# Patient Record
Sex: MALE | Race: WHITE | NOT HISPANIC OR LATINO | Employment: FULL TIME | URBAN - METROPOLITAN AREA
[De-identification: names, ages, dates, MRNs, and addresses within clinical notes are randomized per-mention and may not be internally consistent; named-entity substitution may affect disease eponyms.]

---

## 2017-02-06 ENCOUNTER — HOSPITAL ENCOUNTER (EMERGENCY)
Facility: HOSPITAL | Age: 32
Discharge: HOME | End: 2017-02-06
Attending: EMERGENCY MEDICINE
Payer: COMMERCIAL

## 2017-02-06 VITALS
SYSTOLIC BLOOD PRESSURE: 138 MMHG | HEIGHT: 71 IN | WEIGHT: 219 LBS | DIASTOLIC BLOOD PRESSURE: 72 MMHG | HEART RATE: 99 BPM | TEMPERATURE: 98 F | BODY MASS INDEX: 30.66 KG/M2 | OXYGEN SATURATION: 98 % | RESPIRATION RATE: 20 BRPM

## 2017-02-06 DIAGNOSIS — R51.9 SUDDEN ONSET OF SEVERE HEADACHE: ICD-10-CM

## 2017-02-06 DIAGNOSIS — G43.009 NONINTRACTABLE MIGRAINE, UNSPECIFIED MIGRAINE TYPE: Primary | ICD-10-CM

## 2017-02-06 LAB
ALBUMIN SERPL BCP-MCNC: 3.9 G/DL
ALP SERPL-CCNC: 68 U/L
ALT SERPL W/O P-5'-P-CCNC: 45 U/L
ANION GAP SERPL CALC-SCNC: 10 MMOL/L
AST SERPL-CCNC: 49 U/L
BASOPHILS # BLD AUTO: 0.1 K/UL
BASOPHILS NFR BLD: 0.7 %
BILIRUB SERPL-MCNC: 2 MG/DL
BUN SERPL-MCNC: 15 MG/DL
CALCIUM SERPL-MCNC: 8.9 MG/DL
CHLORIDE SERPL-SCNC: 106 MMOL/L
CLARITY CSF: CLEAR
CO2 SERPL-SCNC: 24 MMOL/L
COLOR CSF: COLORLESS
CREAT SERPL-MCNC: 1.2 MG/DL
DIFFERENTIAL METHOD: ABNORMAL
EOSINOPHIL # BLD AUTO: 0.1 K/UL
EOSINOPHIL NFR BLD: 0.8 %
ERYTHROCYTE [DISTWIDTH] IN BLOOD BY AUTOMATED COUNT: 14.8 %
EST. GFR  (AFRICAN AMERICAN): >60 ML/MIN/1.73 M^2
EST. GFR  (NON AFRICAN AMERICAN): >60 ML/MIN/1.73 M^2
GLUCOSE CSF-MCNC: 52 MG/DL
GLUCOSE SERPL-MCNC: 94 MG/DL
HCT VFR BLD AUTO: 46.5 %
HGB BLD-MCNC: 15.5 G/DL
INR PPP: 1.1
LYMPHOCYTES # BLD AUTO: 0.6 K/UL
LYMPHOCYTES NFR BLD: 6.9 %
MCH RBC QN AUTO: 28.5 PG
MCHC RBC AUTO-ENTMCNC: 33.4 %
MCV RBC AUTO: 86 FL
MONOCYTES # BLD AUTO: 0.4 K/UL
MONOCYTES NFR BLD: 4.8 %
NEUTROPHILS # BLD AUTO: 7.4 K/UL
NEUTROPHILS NFR BLD: 86.8 %
PLATELET # BLD AUTO: 147 K/UL
PMV BLD AUTO: 8.8 FL
POTASSIUM SERPL-SCNC: 3.8 MMOL/L
PROT CSF-MCNC: 52 MG/DL
PROT SERPL-MCNC: 6.5 G/DL
PROTHROMBIN TIME: 11.1 SEC
RBC # BLD AUTO: 5.44 M/UL
RBC # CSF: 2 /CU MM
SODIUM SERPL-SCNC: 140 MMOL/L
SPECIMEN VOL CSF: 7 ML
WBC # BLD AUTO: 8.5 K/UL
WBC # CSF: 2 /CU MM

## 2017-02-06 PROCEDURE — 96365 THER/PROPH/DIAG IV INF INIT: CPT

## 2017-02-06 PROCEDURE — 89051 BODY FLUID CELL COUNT: CPT

## 2017-02-06 PROCEDURE — 25000003 PHARM REV CODE 250

## 2017-02-06 PROCEDURE — 63600175 PHARM REV CODE 636 W HCPCS: Performed by: EMERGENCY MEDICINE

## 2017-02-06 PROCEDURE — 87205 SMEAR GRAM STAIN: CPT

## 2017-02-06 PROCEDURE — 99284 EMERGENCY DEPT VISIT MOD MDM: CPT | Mod: 25

## 2017-02-06 PROCEDURE — 84157 ASSAY OF PROTEIN OTHER: CPT

## 2017-02-06 PROCEDURE — 96375 TX/PRO/DX INJ NEW DRUG ADDON: CPT

## 2017-02-06 PROCEDURE — 25000003 PHARM REV CODE 250: Performed by: EMERGENCY MEDICINE

## 2017-02-06 PROCEDURE — 36415 COLL VENOUS BLD VENIPUNCTURE: CPT

## 2017-02-06 PROCEDURE — 85610 PROTHROMBIN TIME: CPT

## 2017-02-06 PROCEDURE — 99000 SPECIMEN HANDLING OFFICE-LAB: CPT

## 2017-02-06 PROCEDURE — 85025 COMPLETE CBC W/AUTO DIFF WBC: CPT

## 2017-02-06 PROCEDURE — 62270 DX LMBR SPI PNXR: CPT

## 2017-02-06 PROCEDURE — 87070 CULTURE OTHR SPECIMN AEROBIC: CPT

## 2017-02-06 PROCEDURE — 80053 COMPREHEN METABOLIC PANEL: CPT

## 2017-02-06 PROCEDURE — 82945 GLUCOSE OTHER FLUID: CPT

## 2017-02-06 RX ORDER — BUTALBITAL, ACETAMINOPHEN AND CAFFEINE 50; 325; 40 MG/1; MG/1; MG/1
1 TABLET ORAL EVERY 6 HOURS PRN
Qty: 12 TABLET | Refills: 0 | Status: SHIPPED | OUTPATIENT
Start: 2017-02-06 | End: 2017-02-11

## 2017-02-06 RX ORDER — LIDOCAINE HYDROCHLORIDE 10 MG/ML
INJECTION, SOLUTION EPIDURAL; INFILTRATION; INTRACAUDAL; PERINEURAL
Status: COMPLETED
Start: 2017-02-06 | End: 2017-02-06

## 2017-02-06 RX ORDER — PROMETHAZINE HYDROCHLORIDE 25 MG/1
25 TABLET ORAL EVERY 8 HOURS PRN
Qty: 15 TABLET | Refills: 0 | Status: SHIPPED | OUTPATIENT
Start: 2017-02-06 | End: 2017-02-11

## 2017-02-06 RX ORDER — ACETAMINOPHEN 500 MG
1000 TABLET ORAL
Status: COMPLETED | OUTPATIENT
Start: 2017-02-06 | End: 2017-02-06

## 2017-02-06 RX ORDER — DIAZEPAM 5 MG/1
5 TABLET ORAL
Status: COMPLETED | OUTPATIENT
Start: 2017-02-06 | End: 2017-02-06

## 2017-02-06 RX ORDER — KETOROLAC TROMETHAMINE 30 MG/ML
30 INJECTION, SOLUTION INTRAMUSCULAR; INTRAVENOUS
Status: COMPLETED | OUTPATIENT
Start: 2017-02-06 | End: 2017-02-06

## 2017-02-06 RX ADMIN — ACETAMINOPHEN 1000 MG: 500 TABLET ORAL at 05:02

## 2017-02-06 RX ADMIN — DIAZEPAM 5 MG: 5 TABLET ORAL at 04:02

## 2017-02-06 RX ADMIN — PROMETHAZINE HYDROCHLORIDE 12.5 MG: 25 INJECTION, SOLUTION INTRAMUSCULAR; INTRAVENOUS at 04:02

## 2017-02-06 RX ADMIN — LIDOCAINE HYDROCHLORIDE: 10 INJECTION, SOLUTION EPIDURAL; INFILTRATION; INTRACAUDAL; PERINEURAL at 05:02

## 2017-02-06 RX ADMIN — KETOROLAC TROMETHAMINE 30 MG: 30 INJECTION, SOLUTION INTRAMUSCULAR; INTRAVENOUS at 06:02

## 2017-02-06 NOTE — ED PROVIDER NOTES
Encounter Date: 2/6/2017    SCRIBE #1 NOTE: I, Dorothy Abdi, am scribing for, and in the presence of, Dr Bernabe.       History     Chief Complaint   Patient presents with    Headache     left side of head with nausea / facial numbness     Review of patient's allergies indicates:   Allergen Reactions    Pcn [penicillins]      HPI Comments: 02/06/2017  3:32 PM     Chief Complaint: LOPEZ      Meliton Tamayo is a 31 y.o. male with no past medical history on file presenting to the E.D. with an acute onset of a HA which began last night. Pt reports he was resting when HA began but had lifted a tanning bed shortly prior. He normally experiences headaches once every several months and current headache is more severe than baseline HA.  Associated symptoms include facial numbness. Last night he experienced nausea and emesis with nausea persisting today. He has used BC powder with mild relief of symptoms. Denies confusion, fever, neck stiffness. Pt has a past surgical history that includes Appendectomy.      The history is provided by the patient.     History reviewed. No pertinent past medical history.  No past medical history pertinent negatives.  Past Surgical History   Procedure Laterality Date    Appendectomy       History reviewed. No pertinent family history.  Social History   Substance Use Topics    Smoking status: Never Smoker    Smokeless tobacco: None    Alcohol use None     Review of Systems   Constitutional: Negative for fever.   HENT: Negative for sore throat.    Eyes: Negative for visual disturbance.   Respiratory: Negative for cough.    Cardiovascular: Negative for chest pain.   Gastrointestinal: Positive for nausea and vomiting. Negative for abdominal pain and diarrhea.   Genitourinary: Negative for difficulty urinating.   Musculoskeletal: Negative for arthralgias.   Skin: Negative for rash.   Neurological: Positive for numbness and headaches. Negative for weakness.       Physical Exam   Initial Vitals    BP Pulse Resp Temp SpO2   02/06/17 1332 02/06/17 1332 02/06/17 1332 02/06/17 1332 02/06/17 1332   145/64 118 20 98.4 °F (36.9 °C) 95 %     Physical Exam    Nursing note and vitals reviewed.  Constitutional: He appears well-developed and well-nourished.   HENT:   Head: Normocephalic and atraumatic.   Mouth/Throat: Oropharynx is clear and moist.   Eyes: Conjunctivae and EOM are normal. Pupils are equal, round, and reactive to light.   Neck: Neck supple.   Cardiovascular: Normal rate, regular rhythm, normal heart sounds and intact distal pulses. Exam reveals no gallop and no friction rub.    No murmur heard.  Pulmonary/Chest: Breath sounds normal. He has no wheezes. He has no rhonchi. He has no rales.   Abdominal: Soft. He exhibits no distension. There is no tenderness.   Musculoskeletal: Normal range of motion.   Neurological: He is alert and oriented to person, place, and time. He has normal strength. No cranial nerve deficit or sensory deficit.   Pt can feel palpation of his left face   Skin: No rash noted. No erythema.   Psychiatric: He has a normal mood and affect.         ED Course   Lumbar Puncture  Date/Time: 2/9/2017 11:15 AM  Location procedure was performed: [facility] EMERGENCY DEPARTMENT  Performed by: CUONG ESQUIVEL.  Authorized by: CUONG ESQUIVEL   Assisting provider: NAVIN ARAUJO  Pre-operative diagnosis:  Acute headache   Post-operative diagnosis: Complex migraine   Consent Done: Yes  Indications: evaluation for subarachnoid hemorrhage  Anesthesia: local infiltration    Anesthesia:  Anesthesia: local infiltration  Local Anesthetic: lidocaine 1% without epinephrine   Patient sedated: no  Description of findings: clear draining CSF   Preparation: Patient was prepped and draped in the usual sterile fashion.  Lumbar space: L3-L4 interspace  Patient's position: sitting  Needle gauge: 22  Needle type: spinal needle - Quincke tip  Needle length: 3.5 in  Number of attempts: 1  Fluid appearance:  "clear  Tubes of fluid: 4  Total volume: 10 ml  Post-procedure: adhesive bandage applied  Technical procedures used: lumbar puncture   Significant surgical tasks conducted by the assistant(s): holding patient in stable position   Complications: No  Estimated blood loss (mL): 2  Specimens: Yes  Implants: No  Patient tolerance: Patient tolerated the procedure well with no immediate complications        Labs Reviewed   CBC W/ AUTO DIFFERENTIAL - Abnormal; Notable for the following:        Result Value    RDW 14.8 (*)     Platelets 147 (*)     MPV 8.8 (*)     Lymph # 0.6 (*)     Gran% 86.8 (*)     Lymph% 6.9 (*)     All other components within normal limits   COMPREHENSIVE METABOLIC PANEL - Abnormal; Notable for the following:     Total Bilirubin 2.0 (*)     AST 49 (*)     ALT 45 (*)     All other components within normal limits   PROTEIN, CSF - Abnormal; Notable for the following:     Protein, CSF 52 (*)     All other components within normal limits   CSF CELL COUNT WITH DIFFERENTIAL - Abnormal; Notable for the following:     RBC, CSF 2 (*)     All other components within normal limits   CULTURE, CSF  (INCLUDES STAIN)   PROTIME-INR   GLUCOSE, CSF   FREEZE AND HOLD -              Medical Decision Making:   Initial Assessment:   Patient interviewed and examined and found to be in NAD and nonfocal. Patient reports of facial numbness are appears more as paresthesia of the left face as opposed to true "numbness". His reports of acutely worsening, worst headache of his life after exertion are concerning potential underlying intracranial vascular catastrophe. IV was established and he was provided multiple medications with symptom improvement.   Differential Diagnosis:   DDx include, but are not limited to, Complex/typical migraine, tension HA, cluster HA, SAH, acute ischemic stroke,   ED Management:  CT scan negative for acute process. LP shows no signifcant elevation in RBCs, protein, WBCs. On reassessment, patient was " reporting improved pain symptoms but some continuing facial tingling. Asked to monitor closely at home and either f/u with his PCP asap or return to the department. Patient and family in agreement with disposition and plan for f/u. DCd in stable condition with his sig other as a .             Scribe Attestation:   Scribe #1: I performed the above scribed service and the documentation accurately describes the services I performed. I attest to the accuracy of the note.    Attending Attestation:           Physician Attestation for Scribe:  Physician Attestation Statement for Scribe #1: I, Dr. Bernabe, reviewed documentation, as scribed by Dorothy Abdi in my presence, and it is both accurate and complete.                 ED Course     Clinical Impression:   The primary encounter diagnosis was Nonintractable migraine, unspecified migraine type. A diagnosis of Sudden onset of severe headache was also pertinent to this visit.    Disposition:   Disposition: Discharged  Condition: Stable       Jerry Bernabe MD  02/09/17 1110

## 2017-02-06 NOTE — ED NOTES
"States that he had "the worst migraine I've ever had last night" took a BC powder and got some relief also states diarrhea that started yesterday, states main concern is that today all of the left side of his face feels numb including his tongue. Alert calm even non labored respirations. Denies fever states nausea. Family member in chair next to pt aware to notify nurse of needs or concerns.   "

## 2017-02-06 NOTE — ED AVS SNAPSHOT
[facility] [address] [place] [address]               Meliton Tamayo   2017  3:06 PM   ED    Description:  Male : 1985   Department:  [facility]           Your Care was Coordinated By:     Provider Role From To    Jerry Bernabe MD Attending Provider 17 1522 --      Reason for Visit     Headache           Diagnoses this Visit        Comments    Nonintractable migraine, unspecified migraine type    -  Primary     Sudden onset of severe headache           ED Disposition     None           To Do List           Follow-up Information     Schedule an appointment as soon as possible for a visit with [facility].    Specialty:  Family Medicine    Contact information:    [address] [facility] [address] [phone]        Follow up with [facility].    Specialty:  Emergency Medicine    Why:  If symptoms worsen    Contact information:    [address] [place] [address] [phone]       These Medications        Disp Refills Start End    butalbital-acetaminophen-caffeine -40 mg (FIORICET, ESGIC) -40 mg per tablet 12 tablet 0 2017    Take 1 tablet by mouth every 6 (six) hours as needed for Pain. - Oral    promethazine (PHENERGAN) 25 MG tablet 15 tablet 0 2017    Take 1 tablet (25 mg total) by mouth every 8 (eight) hours as needed for Nausea. - Oral      [facility]     [facility] -  Assistance  Registered nurses in the [facility] provide clinical advisement, health education, appointment booking, and other advisory services.  Call for this free service at [phone].             Medications           Message regarding Medications     Verify the changes and/or additions to your medication regime listed below are the same as discussed with your clinician today.  If any of  these changes or additions are incorrect, please notify your healthcare provider.        START taking these NEW medications        Refills    butalbital-acetaminophen-caffeine -40 mg (FIORICET, ESGIC) -40 mg per tablet 0    Sig: Take 1 tablet by mouth every 6 (six) hours as needed for Pain.    Class: Print    Route: Oral    promethazine (PHENERGAN) 25 MG tablet 0    Sig: Take 1 tablet (25 mg total) by mouth every 8 (eight) hours as needed for Nausea.    Class: Print    Route: Oral      These medications were administered today        Dose Freq    promethazine (PHENERGAN) 12.5 mg in dextrose 5 % 50 mL IVPB 12.5 mg ED 1 Time    Sig: Inject 12.5 mg into the vein ED 1 Time.    Class: Normal    Route: Intravenous    diazePAM tablet 5 mg 5 mg ED 1 Time    Sig: Take 1 tablet (5 mg total) by mouth ED 1 Time.    Class: Normal    Route: Oral    lidocaine (PF) 10 mg/ml (1%) 10 mg/mL (1 %) injection      Notes to Pharmacy: Created by cabinet override    acetaminophen tablet 1,000 mg 1,000 mg ED 1 Time    Sig: Take 2 tablets (1,000 mg total) by mouth ED 1 Time.    Class: Normal    Route: Oral    ketorolac injection 30 mg 30 mg ED 1 Time    Sig: Inject 30 mg into the vein ED 1 Time.    Class: Normal    Route: Intravenous           Verify that the below list of medications is an accurate representation of the medications you are currently taking.  If none reported, the list may be blank. If incorrect, please contact your healthcare provider. Carry this list with you in case of emergency.           Current Medications     butalbital-acetaminophen-caffeine -40 mg (FIORICET, ESGIC) -40 mg per tablet Take 1 tablet by mouth every 6 (six) hours as needed for Pain.    promethazine (PHENERGAN) 25 MG tablet Take 1 tablet (25 mg total) by mouth every 8 (eight) hours as needed for Nausea.           Clinical Reference Information           Your Vitals Were     BP Pulse Temp Resp Height Weight    138/72 99 98.4 °F (36.9  "°C) (Oral) 20 5' 11" (1.803 m) 99.3 kg (219 lb)    SpO2 BMI             98% 30.54 kg/m2         Allergies as of 2/6/2017        Reactions    Pcn [Penicillins]       Immunizations Administered on Date of Encounter - 2/6/2017     None      ED Micro, Lab, POCT     Start Ordered       Status Ordering Provider    02/06/17 1834 02/06/17 1834  CSF culture  Once      Preliminary result     02/06/17 1720 02/06/17 1719  Glucose, CSF (Tube 1)  STAT      Final result     02/06/17 1720 02/06/17 1719  Protein, CSF (Tube 1)  STAT      Final result     02/06/17 1720 02/06/17 1719    Once,   Status:  Canceled      Canceled     02/06/17 1720 02/06/17 1719  CSF cell count with differential (Tube 4)  Once      Final result     02/06/17 1720 02/06/17 1719  Freeze and Hold,   Once      Collected     02/06/17 1549 02/06/17 1549  Complete Blood Count (CBC)  STAT      Final result     02/06/17 1549 02/06/17 1549  Comprehensive Metabolic Panel (CMP)  STAT      Final result     02/06/17 1549 02/06/17 1549  Protime-INR  STAT      Final result       ED Imaging Orders     Start Ordered       Status Ordering Provider    02/06/17 1549 02/06/17 1549  CT Head Without Contrast  1 time imaging      Final result         Discharge Instructions         Migraine Headache: Stages and Treatment  A migraine headache tends to progress in stages. Learning these stages can help you better understand what is happening. Then you can learn ways to reduce pain and relieve other symptoms. Methods for relieving your symptoms include self-care and medicines.  Migraine stages  Migraines tend to progress through 4 stages. Many people don't have all stages, and stages may differ with each headache:  · Prodrome. A few hours to a day or so before the headache, you may feel tired, (yawning many times), uneasy, or diaz. You may also feel bloated or crave certain foods.  · Aura. Up to an hour before the headache starts, some migraine sufferers experience aura--flashing " "lights, blind spots, other vision problems, confusion, difficulty speaking, or other neurologic symptoms.  · Headache. Moderate to severe pain affects one side of the head and then can spread to both sides, often along with nausea. You may be highly sensitive to light, sound, and odors. Vomiting or diarrhea may also happen. This stage lasts 4 to 72 hours.  · Postdrome. After your headache ends, you may feel tired, achy, and "washed out." This may last for a day or so.    Self-care during a migraine  Here is what you can do:  · Use a cold compress. Wrap a thin cloth around a cold pack, a cold can of soda, or a bag of frozen vegetables. Apply this to your temple or other pain site.  · Drink fluids. If nausea makes it hard to drink, try sucking on ice.  · Rest. If possible, lie down. Try not to bend over, as this may increase your pain. Sometimes laying in a dark quiet room can help the migraine from being aggravated.    · Try caffeine. Some people find that drinking fluids with caffeine, such as coffee or tea, helps to lessen migraine pain.  Using medicines  Work with your healthcare provider to find the right medicines for you. Medicines for migraine may relieve pain (analgesics), relieve nausea, or attack the migraine's root causes (migraine-specific medicines).  Rebound headache  Taking analgesics each day, or even several times a week, may lead to more frequent and severe headaches. These are called rebound headaches. If you think you're having rebound headaches, tell your healthcare provider. He or she can help you safely decrease your medicine. Rebound caffeine withdrawal headaches can also happen.    Date Last Reviewed: 10/9/2015  © 4510-3642 [employer]. [address] [place] [address], [place] [address]. All rights reserved. This information is not intended as a substitute for professional medical care. Always follow your healthcare professional's instructions.          [facility] Sign-Up     Activating " your [facility] account is as easy as 1-2-3!     1) Visit [email or web address], select Sign Up Now, enter this activation code and your date of birth, then select Next.  [email or web address]  Expires: 3/23/2017  7:56 PM      2) Create a username and password to use when you visit [facility] in the future and select a security question in case you lose your password and select Next.    3) Enter your e-mail address and click Sign Up!    Additional Information  If you have questions, please e-mail [email or web address] or call [phone] to talk to our [facility] staff. Remember, [facility] is NOT to be used for urgent needs. For medical emergencies, dial 911.          [facility] complies with applicable Federal civil rights laws and does not discriminate on the basis of race, color, national origin, age, disability, or sex.        Language Assistance Services     ATTENTION: Language assistance services are available, free of charge. Please call [phone].      ATENCIÓN: Si habla español, tiene a bennett disposición servicios gratuitos de asistencia lingüística. Llame al [phone].     CHÚ Ý: N?u b?n nói Ti?ng Vi?t, có các d?ch v? h? tr? ngôn ng? mi?n phí dành cho b?n. G?i s? [phone].        Medications Administered     acetaminophen tablet 1,000 mg                  diazePAM tablet 5 mg                  ketorolac injection 30 mg                  lidocaine (PF) 10 mg/ml (1%) 10 mg/mL (1 %) injection                  promethazine (PHENERGAN) 12.5 mg in dextrose 5 % 50 mL IVPB                    Administrations This Visit        Admin Date Action                   acetaminophen tablet 1,000 mg 02/06/2017 Given                   Admin Date Action                   diazePAM tablet 5 mg 02/06/2017 Given                   Admin Date Action                   ketorolac injection 30 mg 02/06/2017 Given                   Admin Date Action                   lidocaine (PF) 10 mg/ml (1%) 10 mg/mL (1 %) injection  02/06/2017 Given by Other                   Admin Date Action                   promethazine (PHENERGAN) 12.5 mg in dextrose 5 % 50 mL IVPB 02/06/2017 New Bag                  Administrations This Visit     acetaminophen tablet 1,000 mg     Admin Date Action Dose Route Administered By             02/06/2017 Given 1000 mg Oral Dana Johnson RN                    diazePAM tablet 5 mg     Admin Date Action Dose Route Administered By             02/06/2017 Given 5 mg Oral Dana Johnson RN                    ketorolac injection 30 mg     Admin Date Action Dose Route Administered By             02/06/2017 Given 30 mg Intravenous Dana Johnson RN                    lidocaine (PF) 10 mg/ml (1%) 10 mg/mL (1 %) injection     Admin Date Action Dose Route Administered By             02/06/2017 Given by Other    Dana Johnson RN                    promethazine (PHENERGAN) 12.5 mg in dextrose 5 % 50 mL IVPB     Admin Date Action Dose Rate Route Administered By          02/06/2017 New Bag 12.5 mg 150 mL/hr Intravenous Dana Johnson RN

## 2017-02-07 NOTE — DISCHARGE INSTRUCTIONS
"  Migraine Headache: Stages and Treatment  A migraine headache tends to progress in stages. Learning these stages can help you better understand what is happening. Then you can learn ways to reduce pain and relieve other symptoms. Methods for relieving your symptoms include self-care and medicines.  Migraine stages  Migraines tend to progress through 4 stages. Many people don't have all stages, and stages may differ with each headache:  · Prodrome. A few hours to a day or so before the headache, you may feel tired, (yawning many times), uneasy, or diaz. You may also feel bloated or crave certain foods.  · Aura. Up to an hour before the headache starts, some migraine sufferers experience aura--flashing lights, blind spots, other vision problems, confusion, difficulty speaking, or other neurologic symptoms.  · Headache. Moderate to severe pain affects one side of the head and then can spread to both sides, often along with nausea. You may be highly sensitive to light, sound, and odors. Vomiting or diarrhea may also happen. This stage lasts 4 to 72 hours.  · Postdrome. After your headache ends, you may feel tired, achy, and "washed out." This may last for a day or so.    Self-care during a migraine  Here is what you can do:  · Use a cold compress. Wrap a thin cloth around a cold pack, a cold can of soda, or a bag of frozen vegetables. Apply this to your temple or other pain site.  · Drink fluids. If nausea makes it hard to drink, try sucking on ice.  · Rest. If possible, lie down. Try not to bend over, as this may increase your pain. Sometimes laying in a dark quiet room can help the migraine from being aggravated.    · Try caffeine. Some people find that drinking fluids with caffeine, such as coffee or tea, helps to lessen migraine pain.  Using medicines  Work with your healthcare provider to find the right medicines for you. Medicines for migraine may relieve pain (analgesics), relieve nausea, or attack the " migraine's root causes (migraine-specific medicines).  Rebound headache  Taking analgesics each day, or even several times a week, may lead to more frequent and severe headaches. These are called rebound headaches. If you think you're having rebound headaches, tell your healthcare provider. He or she can help you safely decrease your medicine. Rebound caffeine withdrawal headaches can also happen.    Date Last Reviewed: 10/9/2015  © 9344-2030 [employer]. [address] [place] [address], [place] [address]. All rights reserved. This information is not intended as a substitute for professional medical care. Always follow your healthcare professional's instructions.

## 2017-02-07 NOTE — ED NOTES
Upon discharge, patient is AAOx4, no cardiac or respiratory complications. Follow up care and  Medications have been reviewed with patient and has been instructed to return to the ER if needed. Patient verbalized understanding and ambulated to the lobby without difficulty. LETI MOSQUEDA.

## 2017-02-11 LAB
CMV SPEC QL SHELL VIAL CULT: NO GROWTH
GRAM STN SPEC: NORMAL

## 2017-12-27 ENCOUNTER — HOSPITAL ENCOUNTER (EMERGENCY)
Facility: HOSPITAL | Age: 32
Discharge: HOME | End: 2017-12-27
Attending: EMERGENCY MEDICINE

## 2017-12-27 VITALS
TEMPERATURE: 99 F | BODY MASS INDEX: 30.1 KG/M2 | DIASTOLIC BLOOD PRESSURE: 80 MMHG | SYSTOLIC BLOOD PRESSURE: 124 MMHG | HEIGHT: 71 IN | OXYGEN SATURATION: 97 % | RESPIRATION RATE: 14 BRPM | WEIGHT: 215 LBS | HEART RATE: 113 BPM

## 2017-12-27 DIAGNOSIS — R05.9 COUGH: ICD-10-CM

## 2017-12-27 DIAGNOSIS — R09.81 NASAL CONGESTION: Primary | ICD-10-CM

## 2017-12-27 DIAGNOSIS — R50.9 FEVER: ICD-10-CM

## 2017-12-27 LAB
DEPRECATED S PYO AG THROAT QL EIA: NEGATIVE
FLUAV AG SPEC QL IA: NEGATIVE
FLUBV AG SPEC QL IA: NEGATIVE
SPECIMEN SOURCE: NORMAL

## 2017-12-27 PROCEDURE — 87081 CULTURE SCREEN ONLY: CPT

## 2017-12-27 PROCEDURE — 87400 INFLUENZA A/B EACH AG IA: CPT | Mod: 59

## 2017-12-27 PROCEDURE — 25000003 PHARM REV CODE 250: Performed by: PHYSICIAN ASSISTANT

## 2017-12-27 PROCEDURE — 87880 STREP A ASSAY W/OPTIC: CPT

## 2017-12-27 PROCEDURE — 99284 EMERGENCY DEPT VISIT MOD MDM: CPT

## 2017-12-27 RX ORDER — IBUPROFEN 400 MG/1
800 TABLET ORAL
Status: COMPLETED | OUTPATIENT
Start: 2017-12-27 | End: 2017-12-27

## 2017-12-27 RX ADMIN — IBUPROFEN 800 MG: 400 TABLET ORAL at 12:12

## 2017-12-27 NOTE — ED PROVIDER NOTES
Encounter Date: 12/27/2017    SCRIBE #1 NOTE: I, Maggy Pabon, am scribing for, and in the presence of, DIEGO Kaye.       History     Chief Complaint   Patient presents with    Cough     fever since yesterday.       12/27/2017 12:12 PM     Chief complaint: Fever       Meliton Tamayo is a 32 y.o. male who presents to the ED with an onset of a high-grade fever with associated nasal congestion, rhinorrhea, mild sore throat, cough, and HA. The symptoms began yesterday. He reports a positive sick contacts (son). The wife states that his temperature was 102° last night. The patient/wife deny any other symptoms at this time. No PMHx noted. He has a SHx including an appendectomy. Penicillin drug allergy noted.       The history is provided by the spouse and the patient (wife).     Review of patient's allergies indicates:   Allergen Reactions    Pcn [penicillins]      History reviewed. No pertinent past medical history.  Past Surgical History:   Procedure Laterality Date    APPENDECTOMY       History reviewed. No pertinent family history.  Social History   Substance Use Topics    Smoking status: Never Smoker    Smokeless tobacco: Not on file    Alcohol use Not on file     Review of Systems   Constitutional: Positive for fever (high-grade). Negative for chills.   HENT: Positive for congestion (nasal), rhinorrhea and sore throat (mild). Negative for facial swelling and trouble swallowing.    Eyes: Negative for discharge.   Respiratory: Positive for cough. Negative for chest tightness, shortness of breath and wheezing.    Cardiovascular: Negative for chest pain and palpitations.   Gastrointestinal: Negative for abdominal pain, diarrhea, nausea and vomiting.   Genitourinary: Negative for dysuria and hematuria.   Musculoskeletal: Negative for arthralgias, back pain, joint swelling, myalgias, neck pain and neck stiffness.   Skin: Negative for color change, pallor, rash and wound.   Neurological: Positive for headaches.  Negative for dizziness, syncope, weakness, light-headedness and numbness.   Hematological: Does not bruise/bleed easily.   Psychiatric/Behavioral: The patient is not nervous/anxious.      Physical Exam     Initial Vitals [12/27/17 1131]   BP Pulse Resp Temp SpO2   124/80 (!) 113 14 98.9 °F (37.2 °C) 97 %      MAP       94.67         Physical Exam    Nursing note and vitals reviewed.  Constitutional: He appears well-developed and well-nourished. He is not diaphoretic. No distress.   HENT:   Head: Normocephalic and atraumatic.   Right Ear: External ear normal.   Left Ear: External ear normal.   Nose: Rhinorrhea present.   Mouth/Throat: Posterior oropharyngeal erythema present. No oropharyngeal exudate or posterior oropharyngeal edema.   Nasal congestion and rhinorrhea noted. Mild erythema noted to posterior oropharynx without edema or exudate.    Eyes: Conjunctivae are normal.   Neck: Normal range of motion. Neck supple.   Cardiovascular: Normal rate, regular rhythm, normal heart sounds and intact distal pulses.   No murmur heard.  Pulmonary/Chest: Breath sounds normal. No respiratory distress. He has no wheezes. He has no rhonchi. He has no rales.   Equal, bilateral breath sounds noted without wheezing.      Abdominal: Soft. He exhibits no distension and no mass. There is no tenderness.   Musculoskeletal: Normal range of motion. He exhibits no edema or tenderness.   Lymphadenopathy:     He has no cervical adenopathy.   Neurological: He is alert and oriented to person, place, and time. He has normal strength. No sensory deficit.   Skin: Skin is warm and dry. No rash and no abscess noted. No erythema.   Psychiatric: He has a normal mood and affect.       ED Course   Procedures  Labs Reviewed   THROAT SCREEN, RAPID   CULTURE, STREP A,  THROAT   INFLUENZA A AND B ANTIGEN     Imaging Results    None             Medical Decision Making:   History:   Old Medical Records: I decided to obtain old medical  records.  Differential Diagnosis:   Influenza  Pneumonia  Strep pharyngitis  Meningitis  Viral syndrome    Clinical Tests:   Lab Tests: Reviewed and Ordered  Radiological Study: Reviewed and Ordered       APC / Resident Notes:   Influenza negative.  Low suspicion for pneumonia and no chest x-ray is performed.  Rapid strep negative.  He is likely experiencing a viral syndrome, similar to his son.  We feel comfortable discharging him home to follow-up with his primary care provider for reevaluation in the next couple of days as needed.  He voices understanding and is agreeable to the plan.  He is given specific return precautions.       Scribe Attestation:   Scribe #1: I performed the above scribed service and the documentation accurately describes the services I performed. I attest to the accuracy of the note.    I, Jagruti Kaye PA-C, personally performed the services described in this documentation. All medical record entries made by the scribe were at my direction and in my presence.  I have reviewed the chart and agree that the record reflects my personal performance and is accurate and complete. Jagruti Kaye PA-C.  5:55 PM 12/27/2017          ED Course      Clinical Impression:     1. Nasal congestion    2. Fever    3. Cough          Disposition:   Disposition: Discharged  Condition: Stable                        Jagruti Kaye PA-C  12/27/17 [id number]

## 2017-12-29 LAB — BACTERIA THROAT CULT: NORMAL

## 2021-05-17 ENCOUNTER — HOSPITAL ENCOUNTER (EMERGENCY)
Facility: HOSPITAL | Age: 36
Discharge: HOME | End: 2021-05-17
Attending: EMERGENCY MEDICINE
Payer: COMMERCIAL

## 2021-05-17 VITALS
HEART RATE: 77 BPM | BODY MASS INDEX: 28.44 KG/M2 | RESPIRATION RATE: 18 BRPM | OXYGEN SATURATION: 99 % | SYSTOLIC BLOOD PRESSURE: 149 MMHG | TEMPERATURE: 99 F | HEIGHT: 72 IN | DIASTOLIC BLOOD PRESSURE: 97 MMHG | WEIGHT: 210 LBS

## 2021-05-17 DIAGNOSIS — M54.2 NECK PAIN: Primary | ICD-10-CM

## 2021-05-17 DIAGNOSIS — S06.0X0A CONCUSSION WITHOUT LOSS OF CONSCIOUSNESS, INITIAL ENCOUNTER: ICD-10-CM

## 2021-05-17 DIAGNOSIS — S16.1XXA STRAIN OF NECK MUSCLE, INITIAL ENCOUNTER: ICD-10-CM

## 2021-05-17 LAB
BACTERIA #/AREA URNS HPF: NEGATIVE /HPF
BILIRUB UR QL STRIP: NEGATIVE
CLARITY UR: ABNORMAL
COLOR UR: YELLOW
GLUCOSE UR QL STRIP: NEGATIVE
HGB UR QL STRIP: NEGATIVE
HYALINE CASTS #/AREA URNS LPF: 0 /LPF
KETONES UR QL STRIP: NEGATIVE
LEUKOCYTE ESTERASE UR QL STRIP: NEGATIVE
MICROSCOPIC COMMENT: ABNORMAL
NITRITE UR QL STRIP: NEGATIVE
PH UR STRIP: 8 [PH] (ref 5–8)
PROT UR QL STRIP: NEGATIVE
RBC #/AREA URNS HPF: 0 /HPF (ref 0–4)
SP GR UR STRIP: 1.01 (ref 1–1.03)
SQUAMOUS #/AREA URNS HPF: 0 /HPF
URN SPEC COLLECT METH UR: ABNORMAL
UROBILINOGEN UR STRIP-ACNC: NEGATIVE EU/DL
WBC #/AREA URNS HPF: 0 /HPF (ref 0–5)

## 2021-05-17 PROCEDURE — 25000003 PHARM REV CODE 250: Performed by: EMERGENCY MEDICINE

## 2021-05-17 PROCEDURE — 99284 EMERGENCY DEPT VISIT MOD MDM: CPT | Mod: 25

## 2021-05-17 PROCEDURE — 90471 IMMUNIZATION ADMIN: CPT | Performed by: EMERGENCY MEDICINE

## 2021-05-17 PROCEDURE — 81001 URINALYSIS AUTO W/SCOPE: CPT | Performed by: EMERGENCY MEDICINE

## 2021-05-17 PROCEDURE — 90715 TDAP VACCINE 7 YRS/> IM: CPT | Performed by: EMERGENCY MEDICINE

## 2021-05-17 PROCEDURE — 63600175 PHARM REV CODE 636 W HCPCS: Performed by: EMERGENCY MEDICINE

## 2021-05-17 RX ORDER — METHOCARBAMOL 500 MG/1
1000 TABLET, FILM COATED ORAL 3 TIMES DAILY
Qty: 30 TABLET | Refills: 0 | Status: SHIPPED | OUTPATIENT
Start: 2021-05-17 | End: 2021-05-22

## 2021-05-17 RX ORDER — OXYCODONE AND ACETAMINOPHEN 5; 325 MG/1; MG/1
2 TABLET ORAL
Status: COMPLETED | OUTPATIENT
Start: 2021-05-17 | End: 2021-05-17

## 2021-05-17 RX ORDER — NAPROXEN 500 MG/1
500 TABLET ORAL 2 TIMES DAILY WITH MEALS
Qty: 30 TABLET | Refills: 0 | Status: SHIPPED | OUTPATIENT
Start: 2021-05-17

## 2021-05-17 RX ORDER — METHOCARBAMOL 500 MG/1
1000 TABLET, FILM COATED ORAL
Status: COMPLETED | OUTPATIENT
Start: 2021-05-17 | End: 2021-05-17

## 2021-05-17 RX ADMIN — METHOCARBAMOL 1000 MG: 500 TABLET ORAL at 08:05

## 2021-05-17 RX ADMIN — CLOSTRIDIUM TETANI TOXOID ANTIGEN (FORMALDEHYDE INACTIVATED), CORYNEBACTERIUM DIPHTHERIAE TOXOID ANTIGEN (FORMALDEHYDE INACTIVATED), BORDETELLA PERTUSSIS TOXOID ANTIGEN (GLUTARALDEHYDE INACTIVATED), BORDETELLA PERTUSSIS FILAMENTOUS HEMAGGLUTININ ANTIGEN (FORMALDEHYDE INACTIVATED), BORDETELLA PERTUSSIS PERTACTIN ANTIGEN, AND BORDETELLA PERTUSSIS FIMBRIAE 2/3 ANTIGEN 0.5 ML: 5; 2; 2.5; 5; 3; 5 INJECTION, SUSPENSION INTRAMUSCULAR at 09:05

## 2021-05-17 RX ADMIN — IBUPROFEN 600 MG: 400 TABLET, FILM COATED ORAL at 08:05

## 2021-05-17 RX ADMIN — OXYCODONE AND ACETAMINOPHEN 2 TABLET: 5; 325 TABLET ORAL at 08:05

## 2021-05-18 ENCOUNTER — HOSPITAL ENCOUNTER (OUTPATIENT)
Dept: RADIOLOGY | Facility: HOSPITAL | Age: 36
Discharge: HOME | End: 2021-05-18
Attending: CHIROPRACTOR
Payer: COMMERCIAL

## 2021-05-18 DIAGNOSIS — T88.7XXA DRUG-INDUCED DISORDER OF REFRACTION AND ACCOMMODATION: ICD-10-CM

## 2021-05-18 DIAGNOSIS — H53.8 DRUG-INDUCED DISORDER OF REFRACTION AND ACCOMMODATION: ICD-10-CM

## 2021-05-18 DIAGNOSIS — R11.2 NAUSEA WITH VOMITING: ICD-10-CM

## 2021-05-18 DIAGNOSIS — S19.9XXA INJURY OF NECK: ICD-10-CM

## 2021-05-18 DIAGNOSIS — S19.9XXA INJURY OF NECK: Primary | ICD-10-CM

## 2021-05-18 PROCEDURE — 72141 MRI NECK SPINE W/O DYE: CPT | Mod: TC

## 2024-10-15 DIAGNOSIS — S29.9XXA RIB INJURY: Primary | ICD-10-CM

## 2024-11-26 DIAGNOSIS — R07.89 MUSCULAR CHEST PAIN: Primary | ICD-10-CM

## 2024-11-26 DIAGNOSIS — S29.011A MUSCLE STRAIN OF CHEST WALL, INITIAL ENCOUNTER: ICD-10-CM

## 2024-12-09 ENCOUNTER — HOSPITAL ENCOUNTER (OUTPATIENT)
Dept: RADIOLOGY | Facility: HOSPITAL | Age: 39
Discharge: HOME | End: 2024-12-09
Attending: ORTHOPAEDIC SURGERY
Payer: COMMERCIAL

## 2024-12-09 DIAGNOSIS — S29.011A: Primary | ICD-10-CM

## 2024-12-09 DIAGNOSIS — S29.011A: ICD-10-CM

## 2024-12-09 PROCEDURE — 71550 MRI CHEST W/O DYE: CPT | Mod: 26,,, | Performed by: RADIOLOGY

## 2024-12-09 PROCEDURE — 71550 MRI CHEST W/O DYE: CPT | Mod: TC
